# Patient Record
Sex: MALE | Race: WHITE | NOT HISPANIC OR LATINO | URBAN - METROPOLITAN AREA
[De-identification: names, ages, dates, MRNs, and addresses within clinical notes are randomized per-mention and may not be internally consistent; named-entity substitution may affect disease eponyms.]

---

## 2017-01-14 ENCOUNTER — EMERGENCY (EMERGENCY)
Facility: HOSPITAL | Age: 27
LOS: 1 days | Discharge: HOME | End: 2017-01-14
Attending: EMERGENCY MEDICINE
Payer: COMMERCIAL

## 2017-01-14 VITALS
OXYGEN SATURATION: 99 % | TEMPERATURE: 99 F | RESPIRATION RATE: 18 BRPM | DIASTOLIC BLOOD PRESSURE: 83 MMHG | SYSTOLIC BLOOD PRESSURE: 134 MMHG | HEART RATE: 95 BPM | WEIGHT: 210.1 LBS | HEIGHT: 71 IN

## 2017-01-14 VITALS — DIASTOLIC BLOOD PRESSURE: 64 MMHG | SYSTOLIC BLOOD PRESSURE: 104 MMHG

## 2017-01-14 DIAGNOSIS — R00.2 PALPITATIONS: ICD-10-CM

## 2017-01-14 DIAGNOSIS — E78.00 PURE HYPERCHOLESTEROLEMIA, UNSPECIFIED: ICD-10-CM

## 2017-01-14 DIAGNOSIS — Z95.2 PRESENCE OF PROSTHETIC HEART VALVE: Chronic | ICD-10-CM

## 2017-01-14 DIAGNOSIS — Z98.890 OTHER SPECIFIED POSTPROCEDURAL STATES: Chronic | ICD-10-CM

## 2017-01-14 LAB
ANION GAP SERPL CALC-SCNC: 11 MMOL/L — SIGNIFICANT CHANGE UP (ref 5–17)
BUN SERPL-MCNC: 18 MG/DL — SIGNIFICANT CHANGE UP (ref 8–20)
CALCIUM SERPL-MCNC: 10.1 MG/DL — SIGNIFICANT CHANGE UP (ref 8.6–10.2)
CHLORIDE SERPL-SCNC: 99 MMOL/L — SIGNIFICANT CHANGE UP (ref 98–107)
CO2 SERPL-SCNC: 32 MMOL/L — HIGH (ref 22–29)
CREAT SERPL-MCNC: 0.89 MG/DL — SIGNIFICANT CHANGE UP (ref 0.5–1.3)
GLUCOSE SERPL-MCNC: 119 MG/DL — HIGH (ref 70–115)
POTASSIUM SERPL-MCNC: 3.7 MMOL/L — SIGNIFICANT CHANGE UP (ref 3.5–5.3)
POTASSIUM SERPL-SCNC: 3.7 MMOL/L — SIGNIFICANT CHANGE UP (ref 3.5–5.3)
SODIUM SERPL-SCNC: 142 MMOL/L — SIGNIFICANT CHANGE UP (ref 135–145)

## 2017-01-14 PROCEDURE — 71020: CPT | Mod: 26

## 2017-01-14 PROCEDURE — 71046 X-RAY EXAM CHEST 2 VIEWS: CPT

## 2017-01-14 PROCEDURE — 80048 BASIC METABOLIC PNL TOTAL CA: CPT

## 2017-01-14 PROCEDURE — 93005 ELECTROCARDIOGRAM TRACING: CPT

## 2017-01-14 PROCEDURE — 99284 EMERGENCY DEPT VISIT MOD MDM: CPT

## 2017-01-14 PROCEDURE — 99283 EMERGENCY DEPT VISIT LOW MDM: CPT | Mod: 25

## 2017-01-14 PROCEDURE — 93010 ELECTROCARDIOGRAM REPORT: CPT

## 2017-01-14 RX ORDER — PROPRANOLOL HCL 160 MG
0 CAPSULE, EXTENDED RELEASE 24HR ORAL
Qty: 0 | Refills: 0 | COMMUNITY

## 2017-01-14 NOTE — ED ADULT NURSE NOTE - OBJECTIVE STATEMENT
pt states was sitting on the couch and started to fell his heart racing, resp even and unlabored no distress noted, denies dizziness, ha, chest pain, abd pain, n/v/d

## 2017-01-14 NOTE — ED STATDOCS - OBJECTIVE STATEMENT
A 26 year old male pt presents to the ED c/o palpitations. The pt states that he was lying on his couch when he had a sudden onset of palpitations. He took his blood pressure and noted it to be 140/90, which he states is much higher than his normal. The pt is currently on Inderal 20 mg BID s/p aortic valve replacement and ascending aortic aneurysm repair 6 years ago. He denies any CP or SOB. His cardiologist is Dr. Beltran and he also takes Aspirin daily. No further complaints at this time.

## 2017-01-14 NOTE — ED STATDOCS - NS ED MD SCRIBE ATTENDING SCRIBE SECTIONS
VITAL SIGNS( Pullset)/DISPOSITION/PAST MEDICAL/SURGICAL/SOCIAL HISTORY/HISTORY OF PRESENT ILLNESS/PHYSICAL EXAM/HIV/REVIEW OF SYSTEMS/INTAKE ASSESSMENT/SCREENINGS

## 2017-01-14 NOTE — ED STATDOCS - DETAILS:
I, Carmina David, performed the initial face to face bedside interview with this patient regarding history of present illness, review of symptoms and relevant past medical, social and family history.  I completed an independent physical examination.  I was the initial provider who evaluated this patient. I have signed out the follow up of any pending tests (i.e. labs, radiological studies) to the ACP.  I have communicated the patient’s plan of care and disposition with the ACP.  The history, relevant review of systems, past medical and surgical history, medical decision making, and physical examination was documented by the scribe in my presence and I attest to the accuracy of the documentation.

## 2017-01-14 NOTE — ED STATDOCS - PROGRESS NOTE DETAILS
Pt feeling better and ready for discharge. Discussed with on call Cardiologist and pt is to f/u in office on Monday

## 2017-01-14 NOTE — ED STATDOCS - PMH
AA (Aortic Aneurysm)    Aortic Regurgitation    Aortic Stenosis, Supravalvular    Heart Murmur    History of Tooth Extraction    Hypercholesteremia    Migraines

## 2017-01-14 NOTE — ED STATDOCS - MEDICAL DECISION MAKING DETAILS
A 26 year old male pt presents to the ED c/o palpitations. Will order CXR, re-check blood pressure, and re-evaluate. A 26 year old male pt presents to the ED c/o palpitations. Will order CXR, check kidney function and re-check blood pressure, and re-evaluate.

## 2017-10-04 ENCOUNTER — CLINICAL ADVICE (OUTPATIENT)
Age: 27
End: 2017-10-04

## 2017-10-04 DIAGNOSIS — I71.9 AORTIC ANEURYSM OF UNSPECIFIED SITE, W/OUT RUPTURE: ICD-10-CM

## 2019-01-08 ENCOUNTER — CLINICAL ADVICE (OUTPATIENT)
Age: 29
End: 2019-01-08

## 2019-04-09 ENCOUNTER — APPOINTMENT (OUTPATIENT)
Dept: CT IMAGING | Facility: CLINIC | Age: 29
End: 2019-04-09
Payer: COMMERCIAL

## 2019-04-09 ENCOUNTER — OUTPATIENT (OUTPATIENT)
Dept: OUTPATIENT SERVICES | Facility: HOSPITAL | Age: 29
LOS: 1 days | End: 2019-04-09
Payer: COMMERCIAL

## 2019-04-09 DIAGNOSIS — Z98.890 OTHER SPECIFIED POSTPROCEDURAL STATES: Chronic | ICD-10-CM

## 2019-04-09 DIAGNOSIS — I71.9 AORTIC ANEURYSM OF UNSPECIFIED SITE, WITHOUT RUPTURE: ICD-10-CM

## 2019-04-09 DIAGNOSIS — Z95.2 PRESENCE OF PROSTHETIC HEART VALVE: Chronic | ICD-10-CM

## 2019-04-09 DIAGNOSIS — Z00.00 ENCOUNTER FOR GENERAL ADULT MEDICAL EXAMINATION WITHOUT ABNORMAL FINDINGS: ICD-10-CM

## 2019-04-09 PROCEDURE — 71275 CT ANGIOGRAPHY CHEST: CPT

## 2019-04-09 PROCEDURE — 71275 CT ANGIOGRAPHY CHEST: CPT | Mod: 26

## 2019-04-09 PROCEDURE — 82565 ASSAY OF CREATININE: CPT

## 2019-09-19 ENCOUNTER — APPOINTMENT (OUTPATIENT)
Dept: GASTROENTEROLOGY | Facility: CLINIC | Age: 29
End: 2019-09-19
Payer: COMMERCIAL

## 2019-09-19 VITALS
HEART RATE: 72 BPM | DIASTOLIC BLOOD PRESSURE: 93 MMHG | WEIGHT: 218 LBS | SYSTOLIC BLOOD PRESSURE: 134 MMHG | BODY MASS INDEX: 30.52 KG/M2 | HEIGHT: 71 IN

## 2019-09-19 DIAGNOSIS — Z78.9 OTHER SPECIFIED HEALTH STATUS: ICD-10-CM

## 2019-09-19 DIAGNOSIS — Z86.39 PERSONAL HISTORY OF OTHER ENDOCRINE, NUTRITIONAL AND METABOLIC DISEASE: ICD-10-CM

## 2019-09-19 DIAGNOSIS — R12 HEARTBURN: ICD-10-CM

## 2019-09-19 DIAGNOSIS — Z80.3 FAMILY HISTORY OF MALIGNANT NEOPLASM OF BREAST: ICD-10-CM

## 2019-09-19 DIAGNOSIS — R14.0 ABDOMINAL DISTENSION (GASEOUS): ICD-10-CM

## 2019-09-19 PROCEDURE — 99204 OFFICE O/P NEW MOD 45 MIN: CPT

## 2019-09-19 RX ORDER — ATORVASTATIN CALCIUM 10 MG/1
10 TABLET, FILM COATED ORAL
Refills: 0 | Status: ACTIVE | COMMUNITY

## 2019-09-19 RX ORDER — PROPRANOLOL HYDROCHLORIDE 20 MG/1
20 TABLET ORAL
Refills: 0 | Status: ACTIVE | COMMUNITY

## 2019-09-19 RX ORDER — ASPIRIN 325 MG/1
325 TABLET, COATED ORAL
Refills: 0 | Status: ACTIVE | COMMUNITY

## 2019-09-19 NOTE — PHYSICAL EXAM
[General Appearance - Alert] : alert [General Appearance - In No Acute Distress] : in no acute distress [General Appearance - Well Nourished] : well nourished [General Appearance - Well Developed] : well developed [General Appearance - Well-Appearing] : healthy appearing [Sclera] : the sclera and conjunctiva were normal [PERRL With Normal Accommodation] : pupils were equal in size, round, and reactive to light [Extraocular Movements] : extraocular movements were intact [Outer Ear] : the ears and nose were normal in appearance [Examination Of The Oral Cavity] : the lips and gums were normal [Oropharynx] : the oropharynx was normal [Neck Appearance] : the appearance of the neck was normal [Neck Cervical Mass (___cm)] : no neck mass was observed [Jugular Venous Distention Increased] : there was no jugular-venous distention [Thyroid Diffuse Enlargement] : the thyroid was not enlarged [Thyroid Nodule] : there were no palpable thyroid nodules [Auscultation Breath Sounds / Voice Sounds] : lungs were clear to auscultation bilaterally [Heart Rate And Rhythm] : heart rate was normal and rhythm regular [Heart Sounds] : normal S1 and S2 [Heart Sounds Gallop] : no gallops [Murmurs] : no murmurs [Heart Sounds Pericardial Friction Rub] : no pericardial rub [Bowel Sounds] : normal bowel sounds [Abdomen Soft] : soft [] : no hepato-splenomegaly [Abdomen Mass (___ Cm)] : no abdominal mass palpated [Epigastric] : in the epigastric area [No CVA Tenderness] : no ~M costovertebral angle tenderness [Abnormal Walk] : normal gait [Musculoskeletal - Swelling] : no joint swelling seen [Skin Color & Pigmentation] : normal skin color and pigmentation [Skin Turgor] : normal skin turgor [Oriented To Time, Place, And Person] : oriented to person, place, and time [Periumbilical] : not in the periumbilical area [Suprapubic] : not in the suprapubic area [RUQ] : not in the in the right upper quadrant [RLQ] : not in the right lower quadrant [LUQ] : not in the left upper quadrant [LLQ] : not in the left lower quadrant [FreeTextEntry1] : not done. Not indicated

## 2019-09-19 NOTE — HISTORY OF PRESENT ILLNESS
[None] : had no significant interval events [Heartburn] : heartburn worsened [Nausea] : denies nausea [Vomiting] : denies vomiting [Diarrhea] : denies diarrhea [Constipation] : denies constipation [Yellow Skin Or Eyes (Jaundice)] : denies jaundice [Abdominal Pain] : abdominal pain worsened [Abdominal Swelling] : denies abdominal swelling [Rectal Pain] : denies rectal pain [GERD] : gastroesophageal reflux disease [Wt Gain ___ Lbs] : no recent weight gain [Wt Loss ___ Lbs] : no recent weight loss [Hiatus Hernia] : no hiatus hernia [Peptic Ulcer Disease] : no peptic ulcer disease [Pancreatitis] : no pancreatitis [Cholelithiasis] : no cholelithiasis [Kidney Stone] : no kidney stone [Inflammatory Bowel Disease] : no inflammatory bowel disease [Irritable Bowel Syndrome] : no irritable bowel syndrome [Diverticulitis] : no diverticulitis [Malignancy] : no malignancy [Abdominal Surgery] : no abdominal surgery [Appendectomy] : no appendectomy [Cholecystectomy] : no cholecystectomy [de-identified] : this is the patient's first visit here [de-identified] : Patient presents today for initial evaluation of chronic worsening GERD and and postprandial dyspepsia which has been present for the past 5 years but significantly worse over the past 6 months. He states that he had a negative upper endoscopy done for this problem roughly 5 years ago but his symptoms have worsened over the past 6 months and has not been relieved with over-the-counter ranitidine twice daily. He complains of postprandial epigastric pain and bloating and reflux-related symptoms. He has had no subsequent upper endoscopies. [de-identified] : postprandial dyspepsia

## 2019-09-19 NOTE — ASSESSMENT
[FreeTextEntry1] : Impression: Worsening chronic postprandial dyspepsia and GERD rule out ulcer disease and/or gastritis and/or esophagitis and/or H. pylori infection.\par \par Recommendations: Repeat upper endoscopy was advised for further evaluation of the above. The risks versus benefits of upper endoscopy and intravenous sedation, and alternative testing such as upper GI series, were individually explained to the patient today who appeared to understand all of the above and was agreeable to proceeding with repeat upper endoscopy. His ASA classification is 2. Preprocedure cardiac clearance was requested because of his history of mitral valve replacement and pending cardiac clearance will be medically optimized for the proposed upper endoscopy. He appeared to understand all of the above instructions and management plan.

## 2019-10-18 ENCOUNTER — OUTPATIENT (OUTPATIENT)
Dept: OUTPATIENT SERVICES | Facility: HOSPITAL | Age: 29
LOS: 1 days | End: 2019-10-18
Payer: COMMERCIAL

## 2019-10-18 ENCOUNTER — APPOINTMENT (OUTPATIENT)
Dept: GASTROENTEROLOGY | Facility: GI CENTER | Age: 29
End: 2019-10-18
Payer: COMMERCIAL

## 2019-10-18 ENCOUNTER — RESULT REVIEW (OUTPATIENT)
Age: 29
End: 2019-10-18

## 2019-10-18 DIAGNOSIS — K21.9 GASTRO-ESOPHAGEAL REFLUX DISEASE W/OUT ESOPHAGITIS: ICD-10-CM

## 2019-10-18 DIAGNOSIS — R10.13 EPIGASTRIC PAIN: ICD-10-CM

## 2019-10-18 DIAGNOSIS — Z95.2 PRESENCE OF PROSTHETIC HEART VALVE: Chronic | ICD-10-CM

## 2019-10-18 DIAGNOSIS — Z98.890 OTHER SPECIFIED POSTPROCEDURAL STATES: Chronic | ICD-10-CM

## 2019-10-18 DIAGNOSIS — K21.9 GASTRO-ESOPHAGEAL REFLUX DISEASE WITHOUT ESOPHAGITIS: ICD-10-CM

## 2019-10-18 PROCEDURE — 88305 TISSUE EXAM BY PATHOLOGIST: CPT | Mod: 26

## 2019-10-18 PROCEDURE — 88305 TISSUE EXAM BY PATHOLOGIST: CPT

## 2019-10-18 PROCEDURE — 43239 EGD BIOPSY SINGLE/MULTIPLE: CPT

## 2019-10-18 PROCEDURE — 88342 IMHCHEM/IMCYTCHM 1ST ANTB: CPT | Mod: 26

## 2019-10-18 PROCEDURE — 88342 IMHCHEM/IMCYTCHM 1ST ANTB: CPT

## 2019-10-18 NOTE — REASON FOR VISIT
[Procedure: _________] : a [unfilled] procedure visit [FreeTextEntry1] : Pt is here for EGD for chronic dyspepsia [Endoscopy] : an endoscopy [FreeTextEntry2] : Pt is here for EGD for dyspepsia

## 2019-10-18 NOTE — REVIEW OF SYSTEMS
[Abdominal Pain] : abdominal pain [Heartburn] : heartburn [Negative] : Heme/Lymph [Constipation] : no constipation [Vomiting] : no vomiting [Melena] : no melena [Diarrhea] : no diarrhea [FreeTextEntry7] : dyspepsia

## 2019-10-18 NOTE — ASSESSMENT
[FreeTextEntry1] : Await biopsy results. Continue BID Famotidine therapy for now. RTO in 4 weeks for EGD biopsy results.

## 2019-10-18 NOTE — PROCEDURE
[With Biopsy] : with biopsy [Dyspepsia] : dyspepsia [Procedure Explained] : The procedure was explained [Allergies Reviewed] : allergies reviewed. [Risks] : Risks [Benefits] : benefits [Alternatives] : alternatives [Consent Obtained] : written consent was obtained prior to the procedure and is detailed in the patient's record [Patient] : the patient [Automated Blood Pressure Cuff] : automated blood pressure cuff [Cardiac Monitor] : cardiac monitor [Pulse Oximeter] : pulse oximeter [Versed ___ mg IV] : Versed [unfilled] ~Umg intravenously [Propofol ___ mg IV] : Propofol [unfilled] ~Umg intravenously [___ L/min Oxygen via NC] : [unfilled] ~Uliters/minute oxygen via nasal cannula [3] : 3 [Sedation Clearance] : the patient was cleared for moderate sedation [Time started: ___] : Start Time:  [unfilled] [Time Completed: ___] : Completion Time:  [unfilled] [Performed By: ___] : Performed by:  REYES [Normal] : Normal [Sent to Pathology] : was sent to pathology for analysis [Tolerated Well] : the patient tolerated the procedure well [Vital Signs Stable] : the vital signs were stable [de-identified] : [facility] [id number] [de-identified] : Ketamine 10 mg. IVP [de-identified] : Biopsies taken for H. Pylori. [de-identified] : Biopsies taken for H. Pylori. [de-identified] : Biopsies taken for celiac disease. [de-identified] : Biopsies taken for celiac disease. [de-identified] : Normal EGD. Rule out H. Pylori infection +/or celiac disease.

## 2019-10-18 NOTE — PHYSICAL EXAM
[General Appearance - Alert] : alert [General Appearance - In No Acute Distress] : in no acute distress [General Appearance - Well Developed] : well developed [General Appearance - Well Nourished] : well nourished [General Appearance - Well-Appearing] : healthy appearing [Sclera] : the sclera and conjunctiva were normal [PERRL With Normal Accommodation] : pupils were equal in size, round, and reactive to light [Extraocular Movements] : extraocular movements were intact [Outer Ear] : the ears and nose were normal in appearance [Examination Of The Oral Cavity] : the lips and gums were normal [Neck Appearance] : the appearance of the neck was normal [Oropharynx] : the oropharynx was normal [Jugular Venous Distention Increased] : there was no jugular-venous distention [Neck Cervical Mass (___cm)] : no neck mass was observed [Thyroid Nodule] : there were no palpable thyroid nodules [Thyroid Diffuse Enlargement] : the thyroid was not enlarged [Auscultation Breath Sounds / Voice Sounds] : lungs were clear to auscultation bilaterally [Heart Rate And Rhythm] : heart rate was normal and rhythm regular [Heart Sounds Gallop] : no gallops [Murmurs] : no murmurs [Heart Sounds] : normal S1 and S2 [Heart Sounds Pericardial Friction Rub] : no pericardial rub [Bowel Sounds] : normal bowel sounds [Abdomen Soft] : soft [Epigastric] : in the epigastric area [Abdomen Mass (___ Cm)] : no abdominal mass palpated [] : no hepato-splenomegaly [No CVA Tenderness] : no ~M costovertebral angle tenderness [Abnormal Walk] : normal gait [Skin Color & Pigmentation] : normal skin color and pigmentation [Musculoskeletal - Swelling] : no joint swelling seen [Skin Turgor] : normal skin turgor [Oriented To Time, Place, And Person] : oriented to person, place, and time [Periumbilical] : not in the periumbilical area [Suprapubic] : not in the suprapubic area [RUQ] : not in the in the right upper quadrant [RLQ] : not in the right lower quadrant [LUQ] : not in the left upper quadrant [LLQ] : not in the left lower quadrant [FreeTextEntry1] : not done. Not indicated

## 2019-10-18 NOTE — PHYSICAL EXAM
[General Appearance - In No Acute Distress] : in no acute distress [General Appearance - Alert] : alert [General Appearance - Well Developed] : well developed [General Appearance - Well Nourished] : well nourished [General Appearance - Well-Appearing] : healthy appearing [Sclera] : the sclera and conjunctiva were normal [PERRL With Normal Accommodation] : pupils were equal in size, round, and reactive to light [Extraocular Movements] : extraocular movements were intact [Outer Ear] : the ears and nose were normal in appearance [Examination Of The Oral Cavity] : the lips and gums were normal [Neck Appearance] : the appearance of the neck was normal [Oropharynx] : the oropharynx was normal [Neck Cervical Mass (___cm)] : no neck mass was observed [Jugular Venous Distention Increased] : there was no jugular-venous distention [Thyroid Nodule] : there were no palpable thyroid nodules [Thyroid Diffuse Enlargement] : the thyroid was not enlarged [Auscultation Breath Sounds / Voice Sounds] : lungs were clear to auscultation bilaterally [Heart Rate And Rhythm] : heart rate was normal and rhythm regular [Heart Sounds Gallop] : no gallops [Murmurs] : no murmurs [Heart Sounds] : normal S1 and S2 [Heart Sounds Pericardial Friction Rub] : no pericardial rub [Bowel Sounds] : normal bowel sounds [Abdomen Soft] : soft [] : no hepato-splenomegaly [Epigastric] : in the epigastric area [Abdomen Mass (___ Cm)] : no abdominal mass palpated [No CVA Tenderness] : no ~M costovertebral angle tenderness [Abnormal Walk] : normal gait [Skin Color & Pigmentation] : normal skin color and pigmentation [Musculoskeletal - Swelling] : no joint swelling seen [Skin Turgor] : normal skin turgor [Oriented To Time, Place, And Person] : oriented to person, place, and time [Periumbilical] : not in the periumbilical area [Suprapubic] : not in the suprapubic area [RUQ] : not in the in the right upper quadrant [RLQ] : not in the right lower quadrant [LUQ] : not in the left upper quadrant [LLQ] : not in the left lower quadrant [FreeTextEntry1] : not done. Not indicated

## 2019-10-18 NOTE — REVIEW OF SYSTEMS
[Abdominal Pain] : abdominal pain [Heartburn] : heartburn [Negative] : Endocrine [Vomiting] : no vomiting [Constipation] : no constipation [Diarrhea] : no diarrhea [Melena] : no melena [FreeTextEntry7] : dyspepsia

## 2019-10-18 NOTE — PROCEDURE
[With Biopsy] : with biopsy [Dyspepsia] : dyspepsia [Procedure Explained] : The procedure was explained [Allergies Reviewed] : allergies reviewed. [Risks] : Risks [Benefits] : benefits [Alternatives] : alternatives [Consent Obtained] : written consent was obtained prior to the procedure and is detailed in the patient's record [Patient] : the patient [Automated Blood Pressure Cuff] : automated blood pressure cuff [Cardiac Monitor] : cardiac monitor [Versed ___ mg IV] : Versed [unfilled] ~Umg intravenously [Pulse Oximeter] : pulse oximeter [Propofol ___ mg IV] : Propofol [unfilled] ~Umg intravenously [___ L/min Oxygen via NC] : [unfilled] ~Uliters/minute oxygen via nasal cannula [3] : 3 [Sedation Clearance] : the patient was cleared for moderate sedation [Time started: ___] : Start Time:  [unfilled] [Time Completed: ___] : Completion Time:  [unfilled] [Performed By: ___] : Performed by:  REYES [Normal] : Normal [Sent to Pathology] : was sent to pathology for analysis [Tolerated Well] : the patient tolerated the procedure well [Vital Signs Stable] : the vital signs were stable [de-identified] : [facility] [phone] [id number] [de-identified] : Ketamine 10 mg. IVP [de-identified] : Biopsies taken for H. Pylori. [de-identified] : Biopsies taken for H. Pylori. [de-identified] : Biopsies taken for celiac disease. [de-identified] : Biopsies taken for celiac disease. [de-identified] : Normal EGD. Rule out H. Pylori infection +/or celiac disease.

## 2019-10-24 ENCOUNTER — RX CHANGE (OUTPATIENT)
Age: 29
End: 2019-10-24

## 2019-10-24 RX ORDER — PANTOPRAZOLE 40 MG/1
40 TABLET, DELAYED RELEASE ORAL
Qty: 30 | Refills: 5 | Status: ACTIVE | COMMUNITY
Start: 2019-10-24 | End: 1900-01-01

## 2019-10-28 ENCOUNTER — MEDICATION RENEWAL (OUTPATIENT)
Age: 29
End: 2019-10-28

## 2019-10-28 RX ORDER — FAMOTIDINE 40 MG/1
40 TABLET, FILM COATED ORAL
Qty: 180 | Refills: 1 | Status: ACTIVE | COMMUNITY
Start: 2019-09-19 | End: 1900-01-01

## 2020-01-28 ENCOUNTER — APPOINTMENT (OUTPATIENT)
Dept: GASTROENTEROLOGY | Facility: CLINIC | Age: 30
End: 2020-01-28

## 2021-10-27 ENCOUNTER — NON-APPOINTMENT (OUTPATIENT)
Age: 31
End: 2021-10-27

## 2021-11-24 ENCOUNTER — APPOINTMENT (OUTPATIENT)
Dept: CT IMAGING | Facility: CLINIC | Age: 31
End: 2021-11-24
Payer: COMMERCIAL

## 2021-11-24 ENCOUNTER — OUTPATIENT (OUTPATIENT)
Dept: OUTPATIENT SERVICES | Facility: HOSPITAL | Age: 31
LOS: 1 days | End: 2021-11-24
Payer: COMMERCIAL

## 2021-11-24 DIAGNOSIS — Z95.2 PRESENCE OF PROSTHETIC HEART VALVE: Chronic | ICD-10-CM

## 2021-11-24 DIAGNOSIS — Z98.890 OTHER SPECIFIED POSTPROCEDURAL STATES: Chronic | ICD-10-CM

## 2021-11-24 DIAGNOSIS — I71.9 AORTIC ANEURYSM OF UNSPECIFIED SITE, WITHOUT RUPTURE: ICD-10-CM

## 2021-11-24 PROCEDURE — 71275 CT ANGIOGRAPHY CHEST: CPT | Mod: 26

## 2021-11-24 PROCEDURE — 71275 CT ANGIOGRAPHY CHEST: CPT

## 2022-04-05 NOTE — ED ADULT NURSE NOTE - NS ED NURSE LEVEL OF CONSCIOUSNESS MENTAL STATUS
Stroke Service     Reviewed MRI   IMPRESSION:   1.  No acute intracranial abnormality, specifically no acute ischemia.  2.  Age-related volume loss with extensive chronic microvascular ischemic  changes. Few scattered foci of susceptibility consistent with nonspecific  chronic cerebral micro-bleeds.    Recommendations:  -Suspected TIA   -Continue ASA 81mg. Do not recommend increasing antiplatelets any further given some evidence of chronic microhemorrhages on MRI.   -Continue statin if able to tolerate   -Echo pending  -Keep well hydrated, avoid dehydration   -PM&R consulted- declined IPR as felt to be too high functioning.   -Follow up in stroke clinic for TIA     Please do not hesitate to page with questions or concerns    TERESA Marc  Stroke Nurse Practitioner      The inpatient stroke APC is available on weekdays for non-urgent needs from [phone] at [phone].  The on-call stroke neurologist is available  and [phone] for urgent issues.  Telestroke neurology is utilized [phone] for acute cases.    Please page STAT team \"22\" for acute or sustained neuro changes         
Alert/Awake/Cooperative

## 2022-11-15 ENCOUNTER — OUTPATIENT (OUTPATIENT)
Dept: OUTPATIENT SERVICES | Facility: HOSPITAL | Age: 32
LOS: 1 days | End: 2022-11-15
Payer: COMMERCIAL

## 2022-11-15 ENCOUNTER — APPOINTMENT (OUTPATIENT)
Dept: CT IMAGING | Facility: CLINIC | Age: 32
End: 2022-11-15

## 2022-11-15 DIAGNOSIS — Z95.2 PRESENCE OF PROSTHETIC HEART VALVE: Chronic | ICD-10-CM

## 2022-11-15 DIAGNOSIS — Z98.890 OTHER SPECIFIED POSTPROCEDURAL STATES: Chronic | ICD-10-CM

## 2022-11-15 DIAGNOSIS — I71.9 AORTIC ANEURYSM OF UNSPECIFIED SITE, WITHOUT RUPTURE: ICD-10-CM

## 2022-11-15 PROCEDURE — 71275 CT ANGIOGRAPHY CHEST: CPT | Mod: 26

## 2022-11-15 PROCEDURE — 71275 CT ANGIOGRAPHY CHEST: CPT

## 2023-07-03 ENCOUNTER — NON-APPOINTMENT (OUTPATIENT)
Age: 33
End: 2023-07-03